# Patient Record
Sex: FEMALE | Race: WHITE | Employment: OTHER | URBAN - METROPOLITAN AREA
[De-identification: names, ages, dates, MRNs, and addresses within clinical notes are randomized per-mention and may not be internally consistent; named-entity substitution may affect disease eponyms.]

---

## 2024-07-22 ENCOUNTER — HOSPITAL ENCOUNTER (OUTPATIENT)
Age: 79
Discharge: HOME | End: 2024-07-22
Payer: MEDICARE

## 2024-07-22 VITALS
DIASTOLIC BLOOD PRESSURE: 97 MMHG | SYSTOLIC BLOOD PRESSURE: 141 MMHG | TEMPERATURE: 98 F | OXYGEN SATURATION: 100 % | RESPIRATION RATE: 20 BRPM | HEART RATE: 62 BPM

## 2024-07-22 DIAGNOSIS — R30.0 DYSURIA: Primary | ICD-10-CM

## 2024-07-22 DIAGNOSIS — N32.81 OVERACTIVE BLADDER: ICD-10-CM

## 2024-07-22 DIAGNOSIS — I10 HYPERTENSION, UNSPECIFIED TYPE: ICD-10-CM

## 2024-07-22 LAB
BILIRUB UR QL STRIP: NEGATIVE
CLARITY UR: CLEAR
COLOR UR: YELLOW
GLUCOSE UR STRIP-MCNC: NEGATIVE MG/DL
HGB UR QL STRIP: NEGATIVE
KETONES UR STRIP-MCNC: NEGATIVE MG/DL
LEUKOCYTE ESTERASE UR QL STRIP: NEGATIVE
NITRITE UR QL STRIP: NEGATIVE
PH UR STRIP: 6.5 [PH]
PROT UR STRIP-MCNC: NEGATIVE MG/DL
SP GR UR STRIP: 1.01
UROBILINOGEN UR STRIP-ACNC: <2 MG/DL

## 2024-07-22 PROCEDURE — 99203 OFFICE O/P NEW LOW 30 MIN: CPT | Performed by: NURSE PRACTITIONER

## 2024-07-22 PROCEDURE — 81002 URINALYSIS NONAUTO W/O SCOPE: CPT | Performed by: NURSE PRACTITIONER

## 2024-07-22 RX ORDER — LEVOTHYROXINE SODIUM 0.05 MG/1
50 TABLET ORAL DAILY
COMMUNITY
Start: 2024-05-07

## 2024-07-22 RX ORDER — ATORVASTATIN CALCIUM 10 MG/1
10 TABLET, FILM COATED ORAL NIGHTLY
COMMUNITY
Start: 2024-05-16

## 2024-07-22 RX ORDER — VALSARTAN 320 MG/1
320 TABLET ORAL EVERY MORNING
COMMUNITY
Start: 2024-05-08

## 2024-07-22 RX ORDER — AMLODIPINE BESYLATE 5 MG/1
5 TABLET ORAL DAILY
COMMUNITY
Start: 2024-06-21

## 2024-07-22 NOTE — ED INITIAL ASSESSMENT (HPI)
Pt here with daughter, pt states he has had dysuria for 4 weeks, pt states she has had left flank pain as well , pt denies any fevers or sob

## 2024-07-22 NOTE — ED PROVIDER NOTES
Patient Seen in: [facility]      History     Chief Complaint   Patient presents with    Urinary Symptoms     Stated Complaint: Urinary Symptoms    Subjective:   This is a 78-year-old  female who presents with her daughter chief complaint of dysuria for 4 weeks.  She also admits to urinary frequency and urgency she has a history of an overactive bladder however due to confusion about the medication she is not taking any current medication to treat the overactive bladder.  Patient states she does not have a history of frequent urinary tract infections she denies any hematuria.  She denies any nausea, vomiting, constipation or diarrhea.  She does admit to mild suprapubic.  Patient states the burning associated with urination seems to be getting worse.  The patient speaks only [place] and she was offered a professional medical  however she chooses to have her daughter interpret for her and her daughter agrees with.  The patient recently had a abdominal and pelvic CT done over a possible kidney stone but it did not show any stone in the right kidney of 0.3 cm and also acute cyst in the right kidney that was done on June 7, 2024.              Objective:   History reviewed. No pertinent past medical history.           History reviewed. No pertinent surgical history.             Social History     Socioeconomic History    Marital status:    Tobacco Use    Smoking status: Never    Smokeless tobacco: Never   Vaping Use    Vaping status: Never Used   Substance and Sexual Activity    Alcohol use: Never    Drug use: Never              Review of Systems   Constitutional:  Negative for chills and fever.   Respiratory:  Negative for shortness of breath.    Cardiovascular:  Negative for chest pain.   Gastrointestinal:  Positive for abdominal pain. Negative for constipation, diarrhea, nausea and vomiting.   Genitourinary:  Positive for dysuria, flank pain and frequency. Negative for  hematuria, vaginal bleeding, vaginal discharge and vaginal pain.   Skin:  Negative for rash.       Positive for stated Chief Complaint: Urinary Symptoms    Other systems are as noted in HPI.  Constitutional and vital signs reviewed.      All other systems reviewed and negative except as noted above.    Physical Exam     ED Triage Vitals [07/22/24 1837]   BP (!) 145/94   Pulse 62   Resp 20   Temp 97.9 °F (36.6 °C)   Temp src Temporal   SpO2 100 %   O2 Device None (Room air)       Current Vitals:   Vital Signs  BP: (!) 141/97  Pulse: 62  Resp: 20  Temp: 97.9 °F (36.6 °C)  Temp src: Temporal    Oxygen Therapy  SpO2: 100 %  O2 Device: None (Room air)            Physical Exam  Vitals and nursing note reviewed.   Constitutional:       General: She is not in acute distress.     Appearance: Normal appearance. She is normal weight. She is not ill-appearing or toxic-appearing.   HENT:      Head: Normocephalic and atraumatic.   Eyes:      Conjunctiva/sclera: Conjunctivae normal.   Cardiovascular:      Rate and Rhythm: Normal rate and regular rhythm.      Pulses: Normal pulses.      Heart sounds: No murmur heard.  Pulmonary:      Effort: Pulmonary effort is normal. No respiratory distress.      Breath sounds: Normal breath sounds. No wheezing, rhonchi or rales.   Abdominal:      General: Abdomen is flat.      Palpations: Abdomen is soft.      Tenderness: There is abdominal tenderness (Tender to palpation over suprapubic area). There is left CVA tenderness. There is no right CVA tenderness or guarding.   Skin:     General: Skin is warm and dry.      Coloration: Skin is not jaundiced.   Neurological:      General: No focal deficit present.      Mental Status: She is alert and oriented to person, place, and time.   Psychiatric:         Mood and Affect: Mood normal.         Behavior: Behavior normal.         Thought Content: Thought content normal.         Judgment: Judgment normal.               ED Course     Labs Reviewed   [facility]  POCT URINALYSIS DIPSTICK   URINE CULTURE, ROUTINE                      MDM       The daughter has a photograph of 2 prescriptions that were prescribed for the patient 4 weeks ago when she saw her primary care provider to treat the overactive bladder.  Need 2 prescriptions are for oxybutynin and tolterdine.  When the patient presented both prescriptions to the pharmacist the pharmacist did not fill them because they were both for the same purpose and they were not indicated to be given together.  The patient was supposed to contact her primary care provider and determine if he really wanted both of them or 1 versus the other.  The primary care provider has been out of town and unreachable so the patient has not filled either of those.                         Medical Decision Making  Differential diagnoses: Acute cystitis with hematuria versus without hematuria, urethritis, vaginitis, renal calculi, pyelonephritis, renal colic.  Cormorbidities adding complexity: Hypertension, hyperlipidemia, thyroid disease  My independent interpretation of studies: Urinalysis is negative for acute urinary tract infection.  There is no blood in the urine.  Shared decision making done by: The patient, her daughter, and myself.  We discussed starting the oxybutynin for the overactive bladder symptoms and following up with her primary care provider.  She was asking about starting estrogen cream and she was advised to use the oxybutynin first and to discuss adding the estrogen cream as an adjunct.  Patient was also advised to ask about referral to a urogynecologist for further evaluation and management.  A urine culture was sent and she was advised that we would contact her if there should be any evidence of an infection - at that time we can start antibiotics.          Disposition and Plan     Clinical Impression:  1. Dysuria    2. Overactive bladder    3. Hypertension, unspecified type         Disposition:  Discharge  7/22/2024  7:12  pm    Follow-up:  Juarez Cho MD  [address] [facility]  [address] [place] [id number]  [phone]    In 3 days            Medications Prescribed:  Current Discharge Medication List

## 2024-07-23 NOTE — DISCHARGE INSTRUCTIONS
As we discussed the urinalysis does not show a urinary tract infection at this time.  Your symptoms may be due to an overactive bladder and for this you should start the oxybutynin and prescription as previously prescribed.  Be sure to follow-up with your primary care provider after about a week of taking the medicine to evaluate its effectiveness.  May also try over-the-counter badges still.  When you see your primary care provider ask about a referral to a urogynecologist and discuss the option of starting a low-dose estrogen cream in the vaginal area.  If any of your symptoms become more severe, or you develop a fever, back pain, abdominal pain, or other concerns return here or go to the ER.  Thank you for choosing our [facility] for your medical condition today. Please be sure to follow up with your primary care provider in 2 days if no improvement, or as directed. If any worsening of your symptoms or other concerns call your primary care provider, return here or go to the emergency department.